# Patient Record
Sex: MALE | URBAN - METROPOLITAN AREA
[De-identification: names, ages, dates, MRNs, and addresses within clinical notes are randomized per-mention and may not be internally consistent; named-entity substitution may affect disease eponyms.]

---

## 2019-08-27 ENCOUNTER — APPOINTMENT (RX ONLY)
Dept: URBAN - METROPOLITAN AREA CLINIC 31 | Facility: CLINIC | Age: 69
Setting detail: DERMATOLOGY
End: 2019-08-27

## 2019-08-27 DIAGNOSIS — D22 MELANOCYTIC NEVI: ICD-10-CM

## 2019-08-27 DIAGNOSIS — L82.1 OTHER SEBORRHEIC KERATOSIS: ICD-10-CM

## 2019-08-27 DIAGNOSIS — Z85.828 PERSONAL HISTORY OF OTHER MALIGNANT NEOPLASM OF SKIN: ICD-10-CM

## 2019-08-27 DIAGNOSIS — L57.0 ACTINIC KERATOSIS: ICD-10-CM

## 2019-08-27 DIAGNOSIS — D18.0 HEMANGIOMA: ICD-10-CM

## 2019-08-27 DIAGNOSIS — L72.8 OTHER FOLLICULAR CYSTS OF THE SKIN AND SUBCUTANEOUS TISSUE: ICD-10-CM

## 2019-08-27 DIAGNOSIS — L81.4 OTHER MELANIN HYPERPIGMENTATION: ICD-10-CM

## 2019-08-27 PROBLEM — E78.5 HYPERLIPIDEMIA, UNSPECIFIED: Status: ACTIVE | Noted: 2019-08-27

## 2019-08-27 PROBLEM — D23.9 OTHER BENIGN NEOPLASM OF SKIN, UNSPECIFIED: Status: ACTIVE | Noted: 2019-08-27

## 2019-08-27 PROBLEM — I10 ESSENTIAL (PRIMARY) HYPERTENSION: Status: ACTIVE | Noted: 2019-08-27

## 2019-08-27 PROBLEM — M12.9 ARTHROPATHY, UNSPECIFIED: Status: ACTIVE | Noted: 2019-08-27

## 2019-08-27 PROBLEM — D18.01 HEMANGIOMA OF SKIN AND SUBCUTANEOUS TISSUE: Status: ACTIVE | Noted: 2019-08-27

## 2019-08-27 PROBLEM — D22.5 MELANOCYTIC NEVI OF TRUNK: Status: ACTIVE | Noted: 2019-08-27

## 2019-08-27 PROCEDURE — 99214 OFFICE O/P EST MOD 30 MIN: CPT | Mod: 25

## 2019-08-27 PROCEDURE — ? INVENTORY

## 2019-08-27 PROCEDURE — 17000 DESTRUCT PREMALG LESION: CPT

## 2019-08-27 PROCEDURE — ? COUNSELING

## 2019-08-27 PROCEDURE — ? LIQUID NITROGEN

## 2019-08-27 PROCEDURE — 17003 DESTRUCT PREMALG LES 2-14: CPT

## 2019-08-27 ASSESSMENT — LOCATION ZONE DERM
LOCATION ZONE: SCALP
LOCATION ZONE: TRUNK
LOCATION ZONE: FACE
LOCATION ZONE: NOSE
LOCATION ZONE: EAR

## 2019-08-27 ASSESSMENT — LOCATION DETAILED DESCRIPTION DERM
LOCATION DETAILED: RIGHT POSTERIOR EAR
LOCATION DETAILED: RIGHT SUPERIOR PARIETAL SCALP
LOCATION DETAILED: INFERIOR THORACIC SPINE
LOCATION DETAILED: RIGHT INFERIOR POSTERIOR HELIX
LOCATION DETAILED: RIGHT INFERIOR HELIX
LOCATION DETAILED: STERNUM
LOCATION DETAILED: EPIGASTRIC SKIN
LOCATION DETAILED: RIGHT SUPERIOR MEDIAL UPPER BACK
LOCATION DETAILED: LEFT INFERIOR POSTERIOR HELIX
LOCATION DETAILED: LEFT SUPERIOR LATERAL MALAR CHEEK
LOCATION DETAILED: RIGHT INFERIOR POSTAURICULAR SKIN
LOCATION DETAILED: NASAL ROOT
LOCATION DETAILED: LEFT SUPERIOR POSTERIOR HELIX
LOCATION DETAILED: POSTERIOR MID-PARIETAL SCALP
LOCATION DETAILED: SUPERIOR THORACIC SPINE
LOCATION DETAILED: LEFT SUPERIOR PARIETAL SCALP

## 2019-08-27 ASSESSMENT — LOCATION SIMPLE DESCRIPTION DERM
LOCATION SIMPLE: SCALP
LOCATION SIMPLE: RIGHT UPPER BACK
LOCATION SIMPLE: UPPER BACK
LOCATION SIMPLE: POSTERIOR SCALP
LOCATION SIMPLE: ABDOMEN
LOCATION SIMPLE: NOSE
LOCATION SIMPLE: LEFT EAR
LOCATION SIMPLE: LEFT CHEEK
LOCATION SIMPLE: RIGHT EAR
LOCATION SIMPLE: CHEST

## 2019-08-27 NOTE — PROCEDURE: LIQUID NITROGEN
Consent: The patient's consent was obtained including but not limited to risks of crusting, scabbing, blistering, scarring, darker or lighter pigmentary change, recurrence, incomplete removal and infection.
Number Of Freeze-Thaw Cycles: 1 freeze-thaw cycle
Render Post-Care Instructions In Note?: yes
Duration Of Freeze Thaw-Cycle (Seconds): 1
Post-Care Instructions: I reviewed with the patient in detail post-care instructions. Patient is to wear sunprotection, and avoid picking at any of the treated lesions. Pt may apply Vaseline to crusted or scabbing areas.
Render Note In Bullet Format When Appropriate: No
Detail Level: Detailed

## 2025-02-07 LAB
ALBUMIN SERPL-MCNC: 4 G/DL (ref 3.5–5)
ALP SERPL-CCNC: 61 U/L (ref 38–126)
ALT SERPL-CCNC: 37 U/L (ref 0–50)
AST SERPL-CCNC: 33 U/L (ref 17–59)
BUN SERPL-MCNC: 29 MG/DL (ref 9–20)
CALCIUM SERPL-MCNC: 9.2 MG/DL (ref 8.4–10.2)
CHLORIDE SERPL-SCNC: 104 MMOL/L (ref 98–107)
CO2 SERPL-SCNC: 27 MMOL/L (ref 22–30)
EGFR: > 60
ERYTHROCYTE [DISTWIDTH] IN BLOOD BY AUTOMATED COUNT: 13.5 % (ref 11.5–14.5)
ESTIMATED CREATININE CLEARANCE: 61 ML/MIN
GLUCOSE SERPL-MCNC: 147 MG/DL (ref 70–99)
HCT VFR BLD AUTO: 36.3 % (ref 39–52)
HGB BLD-MCNC: 12.2 G/DL (ref 13–18)
INR PPP: 0.96
MCHC RBC AUTO-ENTMCNC: 33.6 G/DL (ref 33–37)
MCV RBC AUTO: 97.3 FL (ref 80–94)
NRBC BLD AUTO-RTO: 0 %
PLATELET # BLD AUTO: 174 10^3/UL (ref 130–400)
POTASSIUM SERPL-SCNC: 4.4 MMOL/L (ref 3.5–5.1)
PROT SERPL-MCNC: 6.7 G/DL (ref 6.3–8.2)
PROTHROMBIN TIME: 13.1 SEC (ref 11.4–14.6)
SODIUM SERPL-SCNC: 138 MMOL/L (ref 135–145)

## 2025-02-10 ENCOUNTER — HOSPITAL ENCOUNTER (OUTPATIENT)
Dept: HOSPITAL 99 - CATH | Age: 75
Discharge: HOME | End: 2025-02-10
Payer: COMMERCIAL

## 2025-02-10 VITALS — RESPIRATION RATE: 16 BRPM

## 2025-02-10 VITALS — DIASTOLIC BLOOD PRESSURE: 68 MMHG | RESPIRATION RATE: 33 BRPM | SYSTOLIC BLOOD PRESSURE: 99 MMHG

## 2025-02-10 VITALS — DIASTOLIC BLOOD PRESSURE: 95 MMHG | RESPIRATION RATE: 11 BRPM | SYSTOLIC BLOOD PRESSURE: 120 MMHG

## 2025-02-10 VITALS — RESPIRATION RATE: 17 BRPM

## 2025-02-10 VITALS — RESPIRATION RATE: 21 BRPM | SYSTOLIC BLOOD PRESSURE: 108 MMHG | DIASTOLIC BLOOD PRESSURE: 76 MMHG

## 2025-02-10 VITALS — SYSTOLIC BLOOD PRESSURE: 112 MMHG | DIASTOLIC BLOOD PRESSURE: 95 MMHG | RESPIRATION RATE: 19 BRPM

## 2025-02-10 VITALS — OXYGEN SATURATION: 2 % | DIASTOLIC BLOOD PRESSURE: 79 MMHG | RESPIRATION RATE: 18 BRPM

## 2025-02-10 VITALS — DIASTOLIC BLOOD PRESSURE: 65 MMHG | SYSTOLIC BLOOD PRESSURE: 125 MMHG | RESPIRATION RATE: 23 BRPM

## 2025-02-10 VITALS — RESPIRATION RATE: 26 BRPM

## 2025-02-10 VITALS — SYSTOLIC BLOOD PRESSURE: 130 MMHG | DIASTOLIC BLOOD PRESSURE: 79 MMHG

## 2025-02-10 VITALS — RESPIRATION RATE: 19 BRPM | DIASTOLIC BLOOD PRESSURE: 95 MMHG | SYSTOLIC BLOOD PRESSURE: 115 MMHG

## 2025-02-10 VITALS — RESPIRATION RATE: 12 BRPM

## 2025-02-10 VITALS — RESPIRATION RATE: 22 BRPM

## 2025-02-10 VITALS — RESPIRATION RATE: 20 BRPM

## 2025-02-10 VITALS — RESPIRATION RATE: 18 BRPM | SYSTOLIC BLOOD PRESSURE: 111 MMHG | DIASTOLIC BLOOD PRESSURE: 57 MMHG

## 2025-02-10 VITALS — RESPIRATION RATE: 23 BRPM | DIASTOLIC BLOOD PRESSURE: 61 MMHG | SYSTOLIC BLOOD PRESSURE: 114 MMHG

## 2025-02-10 VITALS — RESPIRATION RATE: 13 BRPM

## 2025-02-10 VITALS — DIASTOLIC BLOOD PRESSURE: 58 MMHG | SYSTOLIC BLOOD PRESSURE: 98 MMHG | RESPIRATION RATE: 18 BRPM

## 2025-02-10 VITALS — RESPIRATION RATE: 25 BRPM

## 2025-02-10 VITALS — SYSTOLIC BLOOD PRESSURE: 111 MMHG | DIASTOLIC BLOOD PRESSURE: 71 MMHG | RESPIRATION RATE: 22 BRPM

## 2025-02-10 VITALS — DIASTOLIC BLOOD PRESSURE: 74 MMHG | SYSTOLIC BLOOD PRESSURE: 119 MMHG

## 2025-02-10 VITALS — RESPIRATION RATE: 15 BRPM

## 2025-02-10 VITALS — RESPIRATION RATE: 21 BRPM

## 2025-02-10 VITALS — BODY MASS INDEX: 44.5 KG/M2

## 2025-02-10 DIAGNOSIS — I25.119: Primary | ICD-10-CM

## 2025-02-10 DIAGNOSIS — G47.33: ICD-10-CM

## 2025-02-10 DIAGNOSIS — Z87.891: ICD-10-CM

## 2025-02-10 DIAGNOSIS — Z92.3: ICD-10-CM

## 2025-02-10 DIAGNOSIS — Z95.1: ICD-10-CM

## 2025-02-10 DIAGNOSIS — R05.9: ICD-10-CM

## 2025-02-10 DIAGNOSIS — I50.22: ICD-10-CM

## 2025-02-10 DIAGNOSIS — M19.90: ICD-10-CM

## 2025-02-10 DIAGNOSIS — R00.2: ICD-10-CM

## 2025-02-10 DIAGNOSIS — Z86.718: ICD-10-CM

## 2025-02-10 DIAGNOSIS — Z79.82: ICD-10-CM

## 2025-02-10 DIAGNOSIS — Z85.46: ICD-10-CM

## 2025-02-10 DIAGNOSIS — I77.9: ICD-10-CM

## 2025-02-10 DIAGNOSIS — Z79.899: ICD-10-CM

## 2025-02-10 DIAGNOSIS — I11.0: ICD-10-CM

## 2025-02-10 DIAGNOSIS — E78.5: ICD-10-CM

## 2025-02-10 LAB
ACT BLD: 279 SECONDS (ref 116–155)
ACT BLD: 350 SECONDS (ref 116–155)

## 2025-02-10 PROCEDURE — C9604 PERC D-E COR REVASC T CABG S: HCPCS

## 2025-02-10 PROCEDURE — C1725 CATH, TRANSLUMIN NON-LASER: HCPCS

## 2025-02-10 PROCEDURE — C1894 INTRO/SHEATH, NON-LASER: HCPCS

## 2025-02-10 PROCEDURE — C1760 CLOSURE DEV, VASC: HCPCS

## 2025-02-10 PROCEDURE — 99153 MOD SED SAME PHYS/QHP EA: CPT

## 2025-02-10 PROCEDURE — C1874 STENT, COATED/COV W/DEL SYS: HCPCS

## 2025-02-10 PROCEDURE — 99152 MOD SED SAME PHYS/QHP 5/>YRS: CPT

## 2025-02-10 PROCEDURE — C1884 EMBOLIZATION PROTECT SYST: HCPCS

## 2025-02-10 PROCEDURE — C1887 CATHETER, GUIDING: HCPCS

## 2025-02-10 RX ADMIN — SODIUM CHLORIDE 353 ML: 900 INJECTION, SOLUTION INTRAVENOUS at 08:01

## 2025-02-10 NOTE — W.PN.UPDATE
"Update Note"~"Progress Note Update"~"-: "~"Pt seen post VG-Diagonal PCI w/1 ANGELINA. Right femoral cath site without ht/bleeding, non tender. OOB, urinating without difficulty. Post EKG SB 55 w/1st deg AVB. Pre cath EKG was AFib w/controlled rates. History of AFib post CABG and had been on "~"amiodarone and warfarin at the time. He has had no documented AFib since then, and both amio and warfarin had been discontinued in favor of daily aspirin. [id number]=7. "~"Pt to be on plavix 75mg daily and eliquis 5mg BID. Aspirin will be discontinued. Discussed AFib and stroke risk with pt/wife and they are agreeable to OAC. Cost is acceptable per pt. "~"Cardiac rehab consulted. Followup w/Dr. Titus in March as scheduled."~"Home today if cath site/tele remain stable. "

## 2025-02-10 NOTE — ITS.CL.ANGIO
"Cath Lab - Angioplasty"~"Angioplasty"~"Procedure Report: "~"CARDIAC CATHETERIZATION REPORT"~"Date of Procedure: 2/10/2025"~"Referring: Deangelo Titus D.O."~"INDICATION: Dyspnea on exertion, known coronary artery disease with impaired anginal warning, abnormal stress test."~"PROCEDURE:"~"1.  Left heart catheterization"~"2.  Coronary angiography."~"3.  Bypass angiography."~"4.  Successful PCI of the SVG to diagonal graft."~"A total of 56 minutes of procedural/moderate sedation was utilized.  An independent medical observer was present to assist with and help manage the patient's level of consciousness and physiologic status."~"ACCESS:"~"1.  Sick Chinese right common femoral artery using a modified Seldinger technique with a micropuncture kit under ultrasound guidance.  Ultrasound image obtained."~"CATHETERS:"~"1.  5 Chinese JL 4."~"2.  5 Chinese JR4."~"3.  5 Chinese ARIADNE."~"4.  5 Chinese MP 2."~"5.  6 Chinese AL-1 guiding catheter."~"HEMODYNAMIC DATA"~"Weight (kg): 		117.5"~"AO (s/d/x, mmHg): 		106/79/86"~"LV (s/x mmHg): 		106/20"~"LEFT VENTRICULOGRAPHY: 	Not performed."~"CORONARY ANGIOGRAPHY"~"Dominance: 		Right."~"Left Main: 			Chronically totally occluded at its origin."~"LAD: 			Normal size vessel giving rise to 1 significant diagonal.  The vessel is chronically totally occluded throughout its proximal margin.  The mid and distal vessel supplied by patent LIMA graft.  The diagonal is supplied by a patent SVG."~"Ramus:			Congenitally absent."~"Circumflex: 		Normal size, nondominant vessel giving rise to 1 large obtuse marginal.  The vessel is chronically totally occluded in its proximal margin.  The vessel supplied by a patent left radial artery graft."~"RCA: 			Normal size, dominant vessel.  The vessel is chronically totally occluded at the ostium through the distal RCA.  The RPDA is supplied by a patent SVG with a downward origin requiring a multipurpose catheter for engagement."~"BYPASS GRAFT ANGIOGRAPHY"~"LIMA to LAD:		Normal size graft with end-to-side anastomosis to the mid LAD.  There is no evidence of stenosis or graft degeneration."~"LRA to OM:		Normal size graft with end-to-side anastomosis to the obtuse marginal.  There is no evidence of stenosis or graft degeneration."~"SVG to RPDA:		Normal size graft with downward origin requiring a multipurpose catheter for engagement and end-to-side anastomosis to the RPDA.  There is no evidence of stenosis or graft degeneration."~"SVG to D1:		Normal size graft with end-to-side anastomosis to the first diagonal.  There is a hazy, 90% lesion in the mid graft."~"INTERVENTION(S)"~"1.  Successful PCI of the 90% lesion in the mid SVG to diagonal graft (Medtronic [place] Power 3.0 x 15 ANGELINA, postdilated with a 3.0 NC balloon) with reduction in stenosis to 0%, maintaining ZANDER-3 flow."~"Narrative:"~"The decision was made to proceed with percutaneous coronary intervention. The diagnostic catheter was removed over a wire and a 6Fr AL-1 guiding catheter was advanced to the aortic root and seated in the SVG to D1. Additional heparin was given and a "~"Power Turn Flex wire was advanced into the diagonal through the vein graft.  A 6 mm spider wire was prepped on the back table and advanced over the power turn flex.  The spider wire was deployed approximately 15 to 20 mm distal to the lesion.  The "~"90% mid SVG lesion was predilated with a 2.0 x 12 semi-compliant balloon to 12 fernandez. The semi-compliant balloon was removed and a Medtronic [place] Power 3.0 x 15 drug-eluting stent was advanced. The stent was deployed at 12 atmospheres. The stent "~"balloon was removed. A 3.0 x 12 noncompliant balloon was advanced into the stent and the stent was postdilated to 16 atmospheres. Angiography was performed in orthogonal views, confirming good stent expansion and an excellent angiographic result. "~"The spider wire was recaptured then withdrawn and the guide was disengaged from the artery.  The catheter was removed over a standard J-wire."~"Closure Device: 		6 Chinese Angio-Seal."~"Radiation (mGy): 		1868.65"~"DAP (cm2.Gy): 		131.56"~"Fluoroscopy time (minutes):	11.9"~"CONCLUSIONS"~"1.  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  Moderately elevated filling pressures (LVEDP = 20 mmHg at 117.5 kg), likely appropriate for given ejection fraction (LVEF 20% by report)."~"3.  New diagnosis of paroxysmal atrial fibrillation, CHADS2-Vasc = 5 (CHF, HTN, Age x2, Vascular Disease)."~"RECOMMENDATIONS:"~"1. Expectant management after cardiac catheterization via right common femoral approach."~"2. Limited weight bearing for one week."~"3.  Antithrombotic therapy with clopidogrel and apixaban for at least 12 months, followed by apixaban indefinitely."~"4.  Continue aggressive secondary prevention with high-dose, high potency statin, inclisiran.  Goal LDL &lt;55."~"5.  OMT/GDMT as hemodynamics will tolerate.  Increase metoprolol to 50 mg twice daily for assistance with rate control with paroxysmal atrial fibrillation."~"6.  Consider atrial fibrillation ablation given recent fall off and systolic function."~"7.  Referral to cardiac rehab."~"Copy to: Deangelo Titus D.O., Yousif Souza D.O."~"Álvaro Knox, DO, FACC, FACP"

## 2025-02-25 ENCOUNTER — CLINICAL SUPPORT (OUTPATIENT)
Dept: CARDIAC REHAB | Facility: HOSPITAL | Age: 75
End: 2025-02-25
Attending: INTERNAL MEDICINE
Payer: COMMERCIAL

## 2025-02-25 DIAGNOSIS — Z95.5 STENTED CORONARY ARTERY: Primary | ICD-10-CM

## 2025-02-25 PROCEDURE — 93797 PHYS/QHP OP CAR RHAB WO ECG: CPT

## 2025-02-25 NOTE — PROGRESS NOTES
CARDIAC REHABILITATION   ASSESSMENT AND INDIVIDUALIZED TREATMENT PLAN  INITIAL           Today's date: 2025   # of Exercise Sessions Completed: 1 - Initial evaluation today  Patient name: Danny Wharton      : 1950  Age: 74 y.o.       MRN: [id number]  Referring Physician: Renaldo Powell DO ([facility]) - Dr. Stevens  Cardiologist: Renaldo Powell DO ([facility]) - Send ITPs to Dr. Stevens  Provider: Nikolas  Clinician: Laurie Palacio MS, CEP        Treatment is tailored to this patient's individual needs.  The ITP was reviewed with the patient and all questions were answered to their satisfaction.  Additional ITP documentation can be found electronically including daily and monthly exercise summaries, daily session notes with ECG summaries, education notes, daily medication reconciliation, and daily physician supervision.      INITIAL EVALUATION SUMMARY 2025    Patient's subjective report of progress/symptoms: Pt reports low energy over the past few weeks. Wife was present with patient and reports the he sleeps most of the day and does not do much else. Pt also reports that he has a cold and cough since prior to having stent placed so it is difficult to assess how he is truly feeling since getting the stent.   Home exercise/ADLs: Has treadmill, bike, and dumbbells at home available to use. Has used treadmill at 1.0 mph for 15 minutes and plans to use on days not at rehab.     Initial Fitness Assessment: Submaximal TM ETT:  Resting:  BP: 124/76  HR: 77  Exercise:  BP: 128/78  HR: 108  METs:  2.2  ECG Summary: NSR w/ PVCs  Test terminated at:  RHR +30        Dx:   Encounter Diagnosis   Name Primary?    Stented coronary artery Yes       Description of Diagnosis: LONG to mid SVG to diagonal graft  Date of onset: 2/10/2025  Other Cardiac History: new dx of afib, CABGx4 () - completed cardiac rehab        ASSESSMENT    Medical History:   No past  medical history on file.    Family History:  No family history on file.    Allergies:   Patient has no allergy information on record.    Current Medications:   No current outpatient medications on file.     No current facility-administered medications for this visit.       Medication compliance: Yes   Comments: Pt reports to be compliant with medications    Physical Limitations: Hx of knee replacements (right and left)    Fall Risk: Moderate   Comments: Patient uses walking assist device (walker/cane/rollator)    Cultural needs: none      CAD Risk Factors:  Cholesterol: Yes  HTN: Yes  DM: No  Obesity: Yes   Inactivity: Yes      EXERCISE ASSESSMENT:      Current Functional Status:  Occupation: part time job 2x/wk  Recreation/Physical Activity: [employer]  ADL’s:Capable of performing light ADLs only limited by fatigue  Dane: Capable of performing light ADLs only limited by fatigue  Home exercise:  walking on treadmill 1.0 mph, 15 min  Other Comments: has bike and weights at home in addition to treadmill      SMART Exercise Goals:   10% improvement in functional capacity based on max METs achieved in initial fitness assessment  reduced dyspnea with physical activity    improved DASI score by 10%  increased exercise capacity by 40% based on peak METs tolerated in cardiac rehab exercise session  maintain > 150 minutes per week of moderate intensity exercise    Patient Specific EXERCISE GOALS:       Be able to go [employer] this summer in [place]  Return to [employer] in spring and summer  Increase energy levels    Functional Capacity Screening Tool:  [facility] Activity Status Index:  7.34 METs    NUTRITION ASSESSMENT:    Initial Weight:  256.0 lbs  Current Weight:     Height:   Ht Readings from Last 1 Encounters:   No data found for Ht       Rate Your Plate Score: 55/81    Diabetes: N/A  A1c: not on record    last measured: not on record    Lipid management:  not on record    Current Dietary Habits:  Eats primarily chicken,  "fish, and turkey as protein sources, bakes or grills over frying. Is working to limit late night snacking and is choosing fruits over \"unhealthy\" snacks.     SMART Nutrition Goals:   Improved Rate Your Plate score  >58, eat 2 or more servings of low fat milk or yogurt a day, eat no more than 6oz of meat per day, rarely eat processed meats or eat low fat processed meats, eat meatless meals twice a week or more, rarely eat cheese or choose reduced fat or skim, Do not cook with oil, butter or margarine, and choose healthy desserts and sweets such as madelyn food cake or  fruit    Patient Specific NUTRITION GOALS:     1. Limit intake of canned soups   2. Limit intake of deli meats   3. Choose healthier snacks such as fruits and vegetables    Drug/Alcohol Use:   No      PSYCHOSOCIAL ASSESSMENT:    Date of last Assessment:  2/25/2025  Depression screening:  PHQ-9 = 14    Interpretation:  10-14 = Moderate Depression  Anxiety screening:  JNO-7 = 2    Interpretation: 0-4  = Not anxious    Pt self-report of depression and anxiety   Patient reports they are coping well with good social support and denies depression or anxiety    Self-reported stress level:  4   Stressors:  no specific stressors, reports it is more frustration than stress  Stress Management Tools:  no current hobbies or activities to relieve stress/frustration. Wife reports pt's son has gotten him lego/puzzle type activities to keep busy    SMART Psychosocial Goals:     Reduce perceived stress to 1-3/10, Physical Fitness in [place] Score < 3, Social Activities in [facility] Score < 3, Pain in [place] Score < 3, Overall Health in [facility] Score < 3, Quality of Life in [facility] Score < 3 , and Change in Health in [facility] Score < 3     Patient Specific PSYCHOCOSOCIAL GOALS:    Find a hobby to relieve stress/frustration  Return to activities such as archery this spring/summer  Increase endurance to be able to go kayaking with wife in [place] this " summer    Quality of Life Screen:  (Higher score indicates disease impact on QOL)  [facility] COOP score: 26/45     Social Support:   spouse  Community/Social Activities: spending time with family     Psychosocial Assessment as it relates to rehabilitation:   Patient denies issues with his/her family or home life that may affect their rehabilitation efforts.       OTHER CORE COMPONENT ASSESSMENT:    Tobacco Use:     N/A:  Patient is a non-smoker     Anginal Symptoms:  SOB   NTG use: No prescription    SMART Goals:   consistent, controlled resting BP < 130/80 and medication compliance    Patient Specific CORE COMPONENT GOALS:    Continue to monitor BP at home and report any abnormal trends      INDIVIDUALIZED TREATMENT PLAN      EXERCISE GOALS and PLAN      Progress toward Exercise goals:   Reviewed Pt goals and determined plan of care, Will continue to educate and progress as tolerated.    Exercise Plan:    education on home exercise guidelines, home exercise 30+ mins 2 days opposite CR, Group class: Risk Factors for Heart Disease, and Patient education:   Exercise After Cardiac Rehabilitation    The patient was counseled on exercise guidelines to achieve a minimum of 150 mins/wk of moderate intensity (RPE 4-6)   exercise and encouraged to add 1-2 days of exercise on opposite days of cardiac rehab as tolerated.       PHYSICIAN PRESCRIBED EXERCISE:    Current Aerobic Exercise Prescription:      Frequency: 3 days/week   Supplement with home exercise 2+ days/wk as tolerated       Minutes: 20 - 40         METS: 2.0-2.5            HR: RHR +30-40bpm   RPE: 4-6         Modalities: Treadmill, UBE, NuStep, Recumbent bike, and Room walking     Exercise workloads will be progressed gradually as tolerated, within limits of patient's ability, and according to the patient's   response to the exercise program.      Aerobic Exercise Prescription Plan for Progression   Frequency: 3 days/week of cardiac rehab       Supplement with  home exercise 2+ days/wk as tolerated    Minutes: 40       >150 mins/wk of moderate intensity exercise   METS: 2.3-2.8   HR: RHR +30-40bpm     RPE: 4-6   Modalities: Treadmill, UBE, Lifecycle, NuStep, Recumbent bike, and Room walking    Strength trainin-3 days / week  12-15 repetitions  1-2 sets per modality   Will be added following 2-3 weeks of monitored exercise sessions   Modalities: Pull Downs, Lateral Raise, Arm Extension, Arm Curl, Sit to Stands, and leg extensions    Home Exercise:  walking on treadmill 1.0 mph, 15 min    Exercise Education: benefit of exercise for CAD risk factors, home exercise guidelines, AHA guidelines to achieve >150 mins/wk of moderate exercise, and RPE scale     Readiness to change: Preparation:  (Getting ready to change)       NUTRITION GOALS AND PLAN      Nutritional   Reviewed patient's Rate your Plate. Discussed key elements of heart healthy eating. Reviewed patient goals for dietary modifications and their clinical implications.  Reviewed most recent lipid profile.     Patient's progress toward Nutrition goals:    Reviewed Pt goals and determined plan of care, Will continue to educate and progress as tolerated.      Nutrition Plan:   group class: Reading Food Labels, group Class: Heart Healthy Eating, eat more meatless meals, and eat healthy snacks like fruit, pretzels, and low fat crackers    Measurable goals were based Rate Your Plate Dietary Self-Assessment. These are the areas in which the patient could score higher on the assessment.  Goals include recommendations for a heart healthy diet based on American Heart Association.    Nutrition Education:   heart healthy eating principles  weight loss and management strategies  low sodium diet  maintaining hydration  nutrition for  lipid management    Readiness to change: Preparation:  (Getting ready to change)       PSYCHOSOCIAL GOALS AND PLAN    Psychosocial Assessment as it relates to rehabilitation:   Patient denies issues  with his/her family or home life that may affect their rehabilitation efforts.     Patient's progress toward Psychosocial goals:    Reviewed Pt goals and determined plan of care, Will continue to educate and progress as tolerated.    Psychosocial Intervention/plan:   Class: Stress and Your Health, Spend time outdoors, Enjoy a hobby such as archery, puzzles, and Enjoy family    Psychosocial Education: signs/sxs of depression, benefits of a positive support system, stress management techniques, benefits of enrolling in THERAVECTYS, and depression and CAD    Information to utilize [employer] was provided as well as contact information for counseling through  Behavioral Health and group psychotherapy groups available.    Readiness to change: Preparation:  (Getting ready to change)       OTHER CORE COMPONENTS GOALS and PLAN      Blood Pressure will be monitored throughout the program and cardiologist will be notified of elevated trends.    Pt will be encouraged to monitor home BP if advised by cardiologist.    Tobacco Plan:   N/A:  Pt is a non-smoker    Progress toward Core Component goals:   Reviewed Pt goals and determined plan of care, Will continue to educate and progress as tolerated.    Other Core Components Intervention:   group class: Understanding Heart Disease, group class: Common Heart Medications, medication compliance, avoid processed foods, engage in regular exercise, and monitor home BP    Group and Individual Education:  understanding high blood pressure and it's relationship to CAD and components of blood pressure management    Readiness to change: Preparation:  (Getting ready to change)

## 2025-02-28 ENCOUNTER — CLINICAL SUPPORT (OUTPATIENT)
Dept: CARDIAC REHAB | Facility: HOSPITAL | Age: 75
End: 2025-02-28
Payer: COMMERCIAL

## 2025-02-28 DIAGNOSIS — Z95.5 STENTED CORONARY ARTERY: Primary | ICD-10-CM

## 2025-02-28 PROCEDURE — 93798 PHYS/QHP OP CAR RHAB W/ECG: CPT

## 2025-03-03 ENCOUNTER — CLINICAL SUPPORT (OUTPATIENT)
Dept: CARDIAC REHAB | Facility: HOSPITAL | Age: 75
End: 2025-03-03

## 2025-03-03 DIAGNOSIS — Z95.5 STENTED CORONARY ARTERY: Primary | ICD-10-CM

## 2025-03-03 NOTE — PROGRESS NOTES
Pt arrived to rehab with EKG changes and elevated HR. Strips sent to Dr. Stevens via secure chat. Dr. Stevens recommended pt go to ER to be evaluated. Pt reports he is picking up his wife and heading straight to the ER.

## 2025-04-11 ENCOUNTER — TELEPHONE (OUTPATIENT)
Dept: CARDIAC REHAB | Facility: HOSPITAL | Age: 75
End: 2025-04-11

## 2025-05-13 ENCOUNTER — TELEPHONE (OUTPATIENT)
Dept: CARDIAC REHAB | Facility: HOSPITAL | Age: 75
End: 2025-05-13

## 2025-06-10 ENCOUNTER — TRANSCRIBE ORDERS (OUTPATIENT)
Dept: CARDIAC REHAB | Facility: HOSPITAL | Age: 75
End: 2025-06-10

## 2025-06-10 DIAGNOSIS — Z95.5 STENTED CORONARY ARTERY: Primary | ICD-10-CM

## 2025-06-16 ENCOUNTER — CLINICAL SUPPORT (OUTPATIENT)
Dept: CARDIAC REHAB | Facility: HOSPITAL | Age: 75
End: 2025-06-16
Attending: INTERNAL MEDICINE
Payer: COMMERCIAL

## 2025-06-16 DIAGNOSIS — Z95.5 STENTED CORONARY ARTERY: Primary | ICD-10-CM

## 2025-06-18 ENCOUNTER — CLINICAL SUPPORT (OUTPATIENT)
Dept: CARDIAC REHAB | Facility: HOSPITAL | Age: 75
End: 2025-06-18
Attending: INTERNAL MEDICINE
Payer: COMMERCIAL

## 2025-06-18 DIAGNOSIS — Z95.5 STENTED CORONARY ARTERY: Primary | ICD-10-CM

## 2025-06-18 PROCEDURE — 93798 PHYS/QHP OP CAR RHAB W/ECG: CPT

## 2025-06-20 ENCOUNTER — CLINICAL SUPPORT (OUTPATIENT)
Dept: CARDIAC REHAB | Facility: HOSPITAL | Age: 75
End: 2025-06-20
Payer: COMMERCIAL

## 2025-06-20 DIAGNOSIS — Z95.5 STENTED CORONARY ARTERY: Primary | ICD-10-CM

## 2025-06-20 PROCEDURE — 93798 PHYS/QHP OP CAR RHAB W/ECG: CPT

## 2025-06-23 ENCOUNTER — APPOINTMENT (OUTPATIENT)
Dept: CARDIAC REHAB | Facility: HOSPITAL | Age: 75
End: 2025-06-23
Payer: COMMERCIAL

## 2025-06-25 ENCOUNTER — CLINICAL SUPPORT (OUTPATIENT)
Dept: CARDIAC REHAB | Facility: HOSPITAL | Age: 75
End: 2025-06-25
Payer: COMMERCIAL

## 2025-06-25 DIAGNOSIS — Z95.5 STENTED CORONARY ARTERY: Primary | ICD-10-CM

## 2025-06-25 PROCEDURE — 93798 PHYS/QHP OP CAR RHAB W/ECG: CPT

## 2025-06-25 NOTE — PROGRESS NOTES
Exercise Session Details - increased ectopy noted on telemetry, strips faxed to Dr. Powell, patient's cardiologist.

## 2025-06-27 ENCOUNTER — CLINICAL SUPPORT (OUTPATIENT)
Dept: CARDIAC REHAB | Facility: HOSPITAL | Age: 75
End: 2025-06-27
Payer: COMMERCIAL

## 2025-06-27 DIAGNOSIS — Z95.5 STENTED CORONARY ARTERY: Primary | ICD-10-CM

## 2025-06-27 PROCEDURE — 93798 PHYS/QHP OP CAR RHAB W/ECG: CPT

## 2025-06-30 ENCOUNTER — CLINICAL SUPPORT (OUTPATIENT)
Dept: CARDIAC REHAB | Facility: HOSPITAL | Age: 75
End: 2025-06-30
Payer: COMMERCIAL

## 2025-06-30 DIAGNOSIS — Z95.5 STENTED CORONARY ARTERY: Primary | ICD-10-CM

## 2025-06-30 PROCEDURE — 93798 PHYS/QHP OP CAR RHAB W/ECG: CPT

## 2025-07-02 ENCOUNTER — APPOINTMENT (OUTPATIENT)
Dept: CARDIAC REHAB | Facility: HOSPITAL | Age: 75
End: 2025-07-02
Payer: COMMERCIAL

## 2025-07-07 ENCOUNTER — APPOINTMENT (OUTPATIENT)
Dept: CARDIAC REHAB | Facility: HOSPITAL | Age: 75
End: 2025-07-07
Payer: COMMERCIAL

## 2025-07-09 ENCOUNTER — APPOINTMENT (OUTPATIENT)
Dept: CARDIAC REHAB | Facility: HOSPITAL | Age: 75
End: 2025-07-09
Payer: COMMERCIAL

## 2025-07-11 ENCOUNTER — APPOINTMENT (OUTPATIENT)
Dept: CARDIAC REHAB | Facility: HOSPITAL | Age: 75
End: 2025-07-11
Payer: COMMERCIAL

## 2025-07-11 NOTE — PROGRESS NOTES
CARDIAC REHABILITATION   ASSESSMENT AND INDIVIDUALIZED TREATMENT PLAN  30 DAY REASSESSMENT          Today's date: 2025   # of Exercise Sessions Completed: 6  Patient name: Danny Wharton      : 1950  Age: 74 y.o.       MRN: [id number]  Referring Physician: Dr. Renaldo Powell - ([facility])   Cardiologist: Dr. Renaldo Powell ([facility]) - Send ITPs to Dr. Stevens  Provider: Nikolas  Clinician: Mallika Calvert MS, CEP      Treatment is tailored to this patient's individual needs.  The ITP was reviewed with the patient and all questions were answered to their satisfaction.  Additional ITP documentation can be found electronically including daily and monthly exercise summaries, daily session notes with ECG summaries, education notes, daily medication reconciliation, and daily physician supervision.    REASSESSMENT SUMMARY   DATE:  2025    See ITP below for further details including patient goals and plan of care for progression.    Resting BP  118/70 - 130/72,  HR 64 - 68  Exercise /74- 142/78.  HR 72 - 87  Exercise session details:  36 minutes,  1.8-2.8  METs  Telemetry:  SR  Symptoms: none  Current functional status:    home exercise: no regular home exercise  ADLs: Reports no limitations with ADLs  Patient's subjective report of progress in the past 30 days:  Danny has been doing well in his rehab program over the past 30 days. He is progressing his exercise times and intensities appropriately. He has been feeling good overall with exercise.  Patient has made or is working on dietary modifications:    Danny is not in attendance today to reassess. Will plan to reassess at next session. He has reported following low fat eating plan at evaluation eating primarily chicken, fish, and turkey as protein sources, bakes or grills over frying.   Clinical Comments: no concerns at this time. Will continue progressing exercise program and  monitoring.        Dx:   Encounter Diagnosis   Name Primary?    Stented coronary artery Yes       Description of Diagnosis: LONG to mid SVG to diagonal graft  Date of onset: 2/10/2025      ASSESSMENT    Medical History:   Past Medical History[1]    Family History:  Family History[2]    Allergies:   Patient has no allergy information on record.    Current Medications: yes  Current Medications[3]    Medication compliance:  Pt reports to be compliant with medications    Physical Limitations: Hx of knee replacements (right and left)    Fall Risk: Moderate   Comments: Pt reports unsteady balance      CARDIAC RISK FACTOR MODIFICATION:  Cardiac risk factor modification, including education, counseling, and   behavioral intervention will be provided tailored to the patients' needs.    Cholesterol: Yes  HTN: Yes  DM: No  Obesity: Yes   Inactivity: Yes      EXERCISE ASSESSMENT:      SMART Exercise Goals:   improvement of 0.5 to 1.0 MET in the fitness assessment  improved DASI score by 10%  increased peak METs tolerated in rehab exercise session  attend rehab regularly  maintain > 150 minutes per week of moderate intensity exercise    Patient Specific EXERCISE GOALS:       Return to using his bike at home  Establish regular exercise routine he will be able to maintain following rehab    Functional Capacity Screening Tool:  [facility] Activity Status Index:  6.36 METs    NUTRITION ASSESSMENT:    Initial Weight:  256.8 lbs  Current Weight: 254.8 lb    Height:   Ht Readings from Last 1 Encounters:   No data found for Ht       Rate Your Plate Score: 56/81    Diabetes: N/A  A1c: not on record    last measured: not on record    Lipid management: not on record    Current Dietary Habits:  Eats primarily chicken, fish, and turkey as protein sources, bakes or grills over frying.    SMART Nutrition Goals:   Improved Rate Your Plate score  >64 and weight loss 0.5 - 1 ppw,  goal of 220 lbs.    Patient Specific NUTRITION GOALS:     1. Weight loss  (goal of 220 lbs)   2. Learn about heart healthy eating and label reading    Drug/Alcohol Use:   N/A      PSYCHOSOCIAL ASSESSMENT:    Date of last Assessment:  6/16/2025  Depression screening:  PHQ-9 = 5    Interpretation:  5-9 = Mild Depression  Anxiety screening:  JON-7 = 2    Interpretation: 0-4  = Not anxious    Pt self-report of depression and anxiety   Patient reports they are coping well with good social support and denies depression or anxiety    Self-reported stress level:  3   Stressors:  current health condition  Stress Management Tools: exercise  keep busy around the house  part time job    SMART Psychosocial Goals:     Physical Fitness in [facility] Score < 3  Overall Health in [facility] Score < 3  Quality of Life in [facility] Score < 3     Patient Specific PSYCHOCOSOCIAL GOALS:    Increased energy levels  Continue to work part time  Increased activity levels at home    Quality of Life Screen:  (Higher score indicates disease impact on QOL)  [facility] COOP score: 21/45     Social Support:   spouse  Community/Social Activities: spending time with family     Psychosocial Assessment as it relates to rehabilitation:   Patient denies issues with his/her family or home life that may affect their rehabilitation efforts.       OTHER CORE COMPONENT ASSESSMENT:    Tobacco Use:     N/A:  Patient is a non-smoker     Anginal Symptoms:  None   NTG use: No prescription    SMART Goals:   consistent, controlled resting BP < 130/80    Patient Specific CORE COMPONENT GOALS:    Manage BP with diet, exercise, and medication  Medication Compliance      INDIVIDUALIZED TREATMENT PLAN      EXERCISE GOALS and PLAN      Progress toward Exercise goals:   Pt is progressing and showing improvement  toward the following goals:  increasing strength and endurance with regular exercise 3x/week on CR, increasing functional capacity shown my increasing MET levels..  , Will continue to educate and progress as tolerated.    Exercise Plan:     patient will attend rehab 2-3 times per week to complete 36 exercise sessions  progress workloads as tolerated to maintain RPE 4-6/10  patient will add home exercise 30-45 mins 1-2 days to supplement cardiac rehab  introduce resistance training in rehab session  patient will add home walking increasing distance as tolerated  class: Risk Factors for Heart Disease    The patient was counseled on exercise guidelines to achieve a minimum of 150 mins/wk of moderate intensity (RPE 4-6)   exercise and encouraged to add 1-2 days of exercise on opposite days of cardiac rehab as tolerated.       PHYSICIAN PRESCRIBED EXERCISE:    Current Aerobic Exercise Prescription:      Frequency: 3 days/week   Supplement with home exercise 2+ days/wk as tolerated       Minutes: 30-40         METS: 1.8-2.8           HR: RHR +30-40bpm   RPE: 4-6         Modalities: Treadmill, UBE, NuStep, Recumbent bike, and Room walking     Exercise workloads will be progressed gradually as tolerated, within limits of patient's ability, and according to the patient's   response to the exercise program.      Aerobic Exercise Prescription Plan for Progression   Frequency: 3 days/week of cardiac rehab       Supplement with home exercise 2+ days/wk as tolerated    Minutes: 40       >150 mins/wk of moderate intensity exercise   METS: 2.2-2.8   HR: RHR +30-40bpm     RPE: 4-6   Modalities: Treadmill, UBE, NuStep, Recumbent bike, and Room walking    Strength trainin-3 days / week  12-15 repetitions  1-2 sets per modality   Will be added following 2-3 weeks of monitored exercise sessions   Modalities: Pull Downs, Lateral Raise, Arm Extension, Arm Curl, and Sit to Stands    Home Exercise: none    Exercise Education: benefit of exercise for CAD risk factors, home exercise guidelines, AHA guidelines to achieve >150 mins/wk of moderate exercise, and RPE scale     Readiness to change: Preparation:  (Getting ready to change)       NUTRITION GOALS AND  PLAN      Nutritional   Reviewed patient's Rate your Plate. Discussed key elements of heart healthy eating. Reviewed patient goals for dietary modifications and their clinical implications.  Reviewed most recent lipid profile.     Patient's progress toward Nutrition goals:    Pt is progressing and showing improvement  toward the following goals:  reported following low fat diet Eats primarily chicken, fish, and turkey as protein sources, bakes or grills over frying..  , Will continue to educate and progress as tolerated.      Nutrition Plan:   group class: Reading Food Labels  group Class: Heart Healthy Eating  recognize/reward small successes  increase daily intake of fruits and vegetables  increase daily intake of low fat dairy  limit meat intake to less than 6oz per day  choose healthy meals while dining out    Measurable goals were based Rate Your Plate Dietary Self-Assessment. These are the areas in which the patient could score higher on the assessment.  Goals include recommendations for a heart healthy diet based on American Heart Association.    Nutrition Education:   weight loss strategies  low sodium diet  maintaining hydration  healthy choices while dining out  portion control    Readiness to change: Action:  (Changing behavior)      PSYCHOSOCIAL GOALS AND PLAN    Psychosocial Assessment as it relates to rehabilitation:   Patient denies issues with his/her family or home life that may affect their rehabilitation efforts.     Patient's progress toward Psychosocial goals:    Pt is progressing and showing improvement  toward the following goals:  no concerns with depression or anxiety, has good family support.  , Will continue to educate and progress as tolerated.    Psychosocial Plan:   Class: Stress and Your Health, Class: Relaxation, keep a positive mindset, enjoy family, and Repeat PHQ-9 every 30 days if score >5    Psychosocial Education: signs and symptoms of depression  benefits of a positive support  system  stress management techniques  benefits of enrolling in [employer]  depression and heart disease    Information to utilize [employer] was provided as well as contact information for counseling through  Behavioral [facility] and group psychotherapy groups available.    Readiness to change: Preparation:  (Getting ready to change)       OTHER CORE COMPONENTS GOALS and PLAN      Blood Pressure will be monitored throughout the program and cardiologist will be notified of elevated trends.    Pt will be encouraged to monitor home BP if advised by cardiologist.    Tobacco Plan:   N/A:  Pt is a non-smoker    Progress toward Core Component goals:   Pt is progressing and showing improvement  toward the following goals:  BP WNL no concerns.  , Will continue to educate and progress as tolerated.    Other Core Components Plan:   monitor home BP  medication compliance  monitor daily body weight  follow prescribed sodium restriction    Group and Individual Education:  definition of  hypertension, components of blood pressure management, and maintaining hydration for blood pressure control    Readiness to change: Preparation:  (Getting ready to change)          [1] No past medical history on file.  [2] No family history on file.  [3]   No current outpatient medications on file.     No current facility-administered medications for this visit.

## 2025-07-14 ENCOUNTER — APPOINTMENT (OUTPATIENT)
Dept: CARDIAC REHAB | Facility: HOSPITAL | Age: 75
End: 2025-07-14
Payer: COMMERCIAL

## 2025-07-16 ENCOUNTER — APPOINTMENT (OUTPATIENT)
Dept: CARDIAC REHAB | Facility: HOSPITAL | Age: 75
End: 2025-07-16
Payer: COMMERCIAL

## 2025-07-18 ENCOUNTER — APPOINTMENT (OUTPATIENT)
Dept: CARDIAC REHAB | Facility: HOSPITAL | Age: 75
End: 2025-07-18
Payer: COMMERCIAL

## 2025-07-21 ENCOUNTER — CLINICAL SUPPORT (OUTPATIENT)
Dept: CARDIAC REHAB | Facility: HOSPITAL | Age: 75
End: 2025-07-21
Payer: COMMERCIAL

## 2025-07-21 DIAGNOSIS — Z95.5 STENTED CORONARY ARTERY: Primary | ICD-10-CM

## 2025-07-21 PROCEDURE — 93798 PHYS/QHP OP CAR RHAB W/ECG: CPT

## 2025-07-23 ENCOUNTER — CLINICAL SUPPORT (OUTPATIENT)
Dept: CARDIAC REHAB | Facility: HOSPITAL | Age: 75
End: 2025-07-23
Payer: COMMERCIAL

## 2025-07-23 DIAGNOSIS — Z95.5 STENTED CORONARY ARTERY: Primary | ICD-10-CM

## 2025-07-23 PROCEDURE — 93798 PHYS/QHP OP CAR RHAB W/ECG: CPT

## 2025-07-25 ENCOUNTER — CLINICAL SUPPORT (OUTPATIENT)
Dept: CARDIAC REHAB | Facility: HOSPITAL | Age: 75
End: 2025-07-25
Payer: COMMERCIAL

## 2025-07-25 DIAGNOSIS — Z95.5 STENTED CORONARY ARTERY: Primary | ICD-10-CM

## 2025-07-25 PROCEDURE — 93798 PHYS/QHP OP CAR RHAB W/ECG: CPT

## 2025-07-28 ENCOUNTER — APPOINTMENT (OUTPATIENT)
Dept: CARDIAC REHAB | Facility: HOSPITAL | Age: 75
End: 2025-07-28
Payer: COMMERCIAL

## 2025-07-30 ENCOUNTER — CLINICAL SUPPORT (OUTPATIENT)
Dept: CARDIAC REHAB | Facility: HOSPITAL | Age: 75
End: 2025-07-30
Payer: COMMERCIAL

## 2025-07-30 DIAGNOSIS — Z95.5 STENTED CORONARY ARTERY: Primary | ICD-10-CM

## 2025-07-30 PROCEDURE — 93798 PHYS/QHP OP CAR RHAB W/ECG: CPT

## 2025-08-01 ENCOUNTER — CLINICAL SUPPORT (OUTPATIENT)
Dept: CARDIAC REHAB | Facility: HOSPITAL | Age: 75
End: 2025-08-01
Payer: COMMERCIAL

## 2025-08-01 DIAGNOSIS — Z95.5 STENTED CORONARY ARTERY: Primary | ICD-10-CM

## 2025-08-01 PROCEDURE — 93798 PHYS/QHP OP CAR RHAB W/ECG: CPT

## 2025-08-06 ENCOUNTER — CLINICAL SUPPORT (OUTPATIENT)
Dept: CARDIAC REHAB | Facility: HOSPITAL | Age: 75
End: 2025-08-06
Payer: COMMERCIAL

## 2025-08-06 DIAGNOSIS — Z95.5 STENTED CORONARY ARTERY: Primary | ICD-10-CM

## 2025-08-06 PROCEDURE — 93798 PHYS/QHP OP CAR RHAB W/ECG: CPT

## 2025-08-08 ENCOUNTER — CLINICAL SUPPORT (OUTPATIENT)
Dept: CARDIAC REHAB | Facility: HOSPITAL | Age: 75
End: 2025-08-08
Payer: COMMERCIAL

## 2025-08-08 DIAGNOSIS — Z95.5 STENTED CORONARY ARTERY: Primary | ICD-10-CM

## 2025-08-08 PROCEDURE — 93798 PHYS/QHP OP CAR RHAB W/ECG: CPT

## 2025-08-11 ENCOUNTER — CLINICAL SUPPORT (OUTPATIENT)
Dept: CARDIAC REHAB | Facility: HOSPITAL | Age: 75
End: 2025-08-11
Payer: COMMERCIAL

## 2025-08-13 ENCOUNTER — CLINICAL SUPPORT (OUTPATIENT)
Dept: CARDIAC REHAB | Facility: HOSPITAL | Age: 75
End: 2025-08-13
Payer: COMMERCIAL

## 2025-08-15 ENCOUNTER — CLINICAL SUPPORT (OUTPATIENT)
Dept: CARDIAC REHAB | Facility: HOSPITAL | Age: 75
End: 2025-08-15
Payer: COMMERCIAL

## 2025-08-18 ENCOUNTER — CLINICAL SUPPORT (OUTPATIENT)
Dept: CARDIAC REHAB | Facility: HOSPITAL | Age: 75
End: 2025-08-18
Payer: COMMERCIAL

## 2025-08-18 DIAGNOSIS — Z95.5 STENTED CORONARY ARTERY: Primary | ICD-10-CM

## 2025-08-18 PROCEDURE — 93798 PHYS/QHP OP CAR RHAB W/ECG: CPT

## 2025-08-21 PROBLEM — I25.10 CORONARY ARTERY DISEASE: Status: ACTIVE | Noted: 2025-08-21

## 2025-08-21 PROBLEM — N52.02 MALE ERECTILE DYSFUNCTION DUE TO CORPOROVENOUS OCCLUSION: Status: ACTIVE | Noted: 2025-08-21

## 2025-08-21 PROBLEM — E78.5 HYPERLIPIDEMIA: Status: ACTIVE | Noted: 2025-08-21

## 2025-08-21 PROBLEM — I47.10 SUPRAVENTRICULAR TACHYCARDIA (HCC): Status: ACTIVE | Noted: 2025-08-21

## 2025-08-21 PROBLEM — E66.01 MORBID OBESITY WITH BODY MASS INDEX (BMI) OF 40.0 TO 49.9 (HCC): Status: ACTIVE | Noted: 2025-08-21

## 2025-08-21 PROBLEM — R19.8 BOWEL MOVEMENT SYMPTOM: Status: ACTIVE | Noted: 2025-08-21

## 2025-08-21 PROBLEM — M19.90 OSTEOARTHRITIS: Status: ACTIVE | Noted: 2025-08-21

## 2025-08-21 PROBLEM — C61 MALIGNANT NEOPLASM OF PROSTATE (HCC): Status: ACTIVE | Noted: 2025-08-21

## 2025-08-21 PROBLEM — I10 HYPERTENSION: Status: ACTIVE | Noted: 2025-08-21

## 2025-08-21 PROBLEM — I50.20 SYSTOLIC CONGESTIVE HEART FAILURE (HCC): Status: ACTIVE | Noted: 2025-08-21

## 2025-08-21 PROBLEM — G47.33 OBSTRUCTIVE SLEEP APNEA SYNDROME: Status: ACTIVE | Noted: 2025-08-21

## 2025-08-21 PROBLEM — R39.89 HARD, FIRM PROSTATE: Status: ACTIVE | Noted: 2025-08-21

## 2025-08-21 PROBLEM — R73.09 GLUCOSE TOLERANCE TEST ABNORMAL: Status: ACTIVE | Noted: 2025-08-21

## 2025-08-22 ENCOUNTER — CLINICAL SUPPORT (OUTPATIENT)
Dept: CARDIAC REHAB | Facility: HOSPITAL | Age: 75
End: 2025-08-22
Payer: COMMERCIAL

## 2025-08-22 DIAGNOSIS — Z95.5 STENTED CORONARY ARTERY: Primary | ICD-10-CM

## 2025-08-22 PROCEDURE — 93798 PHYS/QHP OP CAR RHAB W/ECG: CPT
